# Patient Record
Sex: MALE | Race: WHITE | Employment: OTHER | URBAN - METROPOLITAN AREA
[De-identification: names, ages, dates, MRNs, and addresses within clinical notes are randomized per-mention and may not be internally consistent; named-entity substitution may affect disease eponyms.]

---

## 2017-10-26 ENCOUNTER — OFFICE VISIT (OUTPATIENT)
Dept: ORTHOPEDIC SURGERY | Age: 62
End: 2017-10-26

## 2017-10-26 VITALS
HEIGHT: 71 IN | SYSTOLIC BLOOD PRESSURE: 139 MMHG | DIASTOLIC BLOOD PRESSURE: 81 MMHG | HEART RATE: 101 BPM | WEIGHT: 199 LBS | BODY MASS INDEX: 27.86 KG/M2

## 2017-10-26 DIAGNOSIS — S62.660B OPEN NONDISPLACED FRACTURE OF DISTAL PHALANX OF RIGHT INDEX FINGER, INITIAL ENCOUNTER: Primary | ICD-10-CM

## 2017-10-26 DIAGNOSIS — S61.319A LACERATION OF NAIL BED OF FINGER, INITIAL ENCOUNTER: ICD-10-CM

## 2017-10-26 PROCEDURE — 26750 TREAT FINGER FRACTURE EACH: CPT | Performed by: ORTHOPAEDIC SURGERY

## 2017-10-26 PROCEDURE — 99203 OFFICE O/P NEW LOW 30 MIN: CPT | Performed by: ORTHOPAEDIC SURGERY

## 2017-10-26 NOTE — PROGRESS NOTES
This 58 y.o.  right hand dominant disabled man is seen in referral for the ED at [facility] with a chief complaint of injury to their right index finger, which was injured 4 days ago when using a table saw, the wooden board kicked back, striking the finger tip. He noticed pain and almost complete detachment of the finger nail . He was evaluated at the [facility] were obtained and the patient's nailbed laceration was repaired with absorbable sutures. He was placed on Keflex, tetanus updated, dressed and referred for hand/upper extremity evaluation and treatment. There is no history of additional significant injury. Symptoms have improved since the date of injury. The pain assessment has been reviewed and is correct. The patient's social history, past medical history, family history, medications, allergies and review of systems, entered 10/26/17,  have been reviewed, and dated and are recorded in the chart. On physical examination the patient is Height: 5' 11\" (180.3 cm) tall and weighs Weight: 199 lb (90.3 kg). Respirations are 18 per minute. The patient is well nourished, is oriented to time and place, demonstrates appropriate mood and affect as well as normal gait and station. There is moderate soft tissue swelling present about the right index finger, tip. There is mild discoloration. There is no deformity. Tenderness is present on palpation. Range of motion of the index finger is limited only on the right and is accompanied by pain. There is a small open wound at the tip. The nail has been replaced and sutured at the nail sulci. Distal circulation and sensation are intact. Gross muscle strength is normal bilaterally. Hand and wrist joints are stable. There are no subcutaneous nodules or enlarged epitrochlear lymph nodes. Xrays: Review of outside Xrays of the right index finger demonstrate a comminuted fracture of the distal tuft.     Impression: Open fracture distal

## 2017-10-26 NOTE — LETTER
[facility]  [address]  [address] [facility] [address]  Phone: [phone]  Fax: [phone]    Adam Coreas MD        October 26, 2017     [address] [place] [id number]    Patient: Reji Grider  MR Number: [id number]  YOB: 1955  Date of Visit: 10/26/2017    Dear Dr. Lucian Hernández:    Reji Grider was seen for evaluation. Below are the relevant portions of my assessment and plan of care. This 58 y.o.  right hand dominant disabled man is seen in referral for the ED at [facility] with a chief complaint of injury to their right index finger, which was injured 4 days ago when using a table saw, the wooden board kicked back, striking the finger tip. He noticed pain and almost complete detachment of the finger nail . He was evaluated at the [facility] were obtained and the patient's nailbed laceration was repaired with absorbable sutures. He was placed on Keflex, tetanus updated, dressed and referred for hand/upper extremity evaluation and treatment. There is no history of additional significant injury. Symptoms have improved since the date of injury. The pain assessment has been reviewed and is correct. The patient's social history, past medical history, family history, medications, allergies and review of systems, entered 10/26/17,  have been reviewed, and dated and are recorded in the chart. On physical examination the patient is Height: 5' 11\" (180.3 cm) tall and weighs Weight: 199 lb (90.3 kg). Respirations are 18 per minute. The patient is well nourished, is oriented to time and place, demonstrates appropriate mood and affect as well as normal gait and station. There is moderate soft tissue swelling present about the right index finger, tip. There is mild discoloration. There is no deformity.    Tenderness is

## 2017-11-06 ENCOUNTER — OFFICE VISIT (OUTPATIENT)
Dept: ORTHOPEDIC SURGERY | Age: 62
End: 2017-11-06

## 2017-11-06 VITALS — WEIGHT: 200 LBS | RESPIRATION RATE: 16 BRPM | BODY MASS INDEX: 28 KG/M2 | HEIGHT: 71 IN

## 2017-11-06 DIAGNOSIS — S62.660B OPEN NONDISPLACED FRACTURE OF DISTAL PHALANX OF RIGHT INDEX FINGER, INITIAL ENCOUNTER: Primary | ICD-10-CM

## 2017-11-06 DIAGNOSIS — S61.319A LACERATION OF NAIL BED OF FINGER, INITIAL ENCOUNTER: ICD-10-CM

## 2017-11-06 PROCEDURE — 99024 POSTOP FOLLOW-UP VISIT: CPT | Performed by: ORTHOPAEDIC SURGERY

## 2018-12-25 ENCOUNTER — APPOINTMENT (OUTPATIENT)
Dept: GENERAL RADIOLOGY | Age: 63
End: 2018-12-25
Payer: MEDICARE

## 2018-12-25 ENCOUNTER — HOSPITAL ENCOUNTER (EMERGENCY)
Age: 63
Discharge: HOME | End: 2018-12-25
Attending: EMERGENCY MEDICINE
Payer: MEDICARE

## 2018-12-25 VITALS
OXYGEN SATURATION: 97 % | HEART RATE: 93 BPM | RESPIRATION RATE: 16 BRPM | WEIGHT: 200 LBS | DIASTOLIC BLOOD PRESSURE: 99 MMHG | TEMPERATURE: 98.3 F | BODY MASS INDEX: 27.89 KG/M2 | SYSTOLIC BLOOD PRESSURE: 141 MMHG

## 2018-12-25 DIAGNOSIS — M25.571 ACUTE RIGHT ANKLE PAIN: ICD-10-CM

## 2018-12-25 DIAGNOSIS — S82.891A CLOSED FRACTURE OF RIGHT ANKLE, INITIAL ENCOUNTER: Primary | ICD-10-CM

## 2018-12-25 PROCEDURE — 6360000002 HC RX W HCPCS: Performed by: NURSE PRACTITIONER

## 2018-12-25 PROCEDURE — 94761 N-INVAS EAR/PLS OXIMETRY MLT: CPT

## 2018-12-25 PROCEDURE — 73610 X-RAY EXAM OF ANKLE: CPT

## 2018-12-25 PROCEDURE — 2700000000 HC OXYGEN THERAPY PER DAY

## 2018-12-25 PROCEDURE — 99284 EMERGENCY DEPT VISIT MOD MDM: CPT

## 2018-12-25 PROCEDURE — 94770 HC ETCO2 MONITOR DAILY: CPT

## 2018-12-25 PROCEDURE — 4500000024 HC ED LEVEL 4 PROCEDURE

## 2018-12-25 PROCEDURE — 2500000003 HC RX 250 WO HCPCS: Performed by: EMERGENCY MEDICINE

## 2018-12-25 PROCEDURE — 94664 DEMO&/EVAL PT USE INHALER: CPT

## 2018-12-25 PROCEDURE — 73600 X-RAY EXAM OF ANKLE: CPT

## 2018-12-25 PROCEDURE — 6370000000 HC RX 637 (ALT 250 FOR IP): Performed by: NURSE PRACTITIONER

## 2018-12-25 PROCEDURE — 96374 THER/PROPH/DIAG INJ IV PUSH: CPT

## 2018-12-25 RX ORDER — OXYCODONE HYDROCHLORIDE AND ACETAMINOPHEN 5; 325 MG/1; MG/1
1 TABLET ORAL ONCE
Status: DISCONTINUED | OUTPATIENT
Start: 2018-12-25 | End: 2018-12-25 | Stop reason: HOSPADM

## 2018-12-25 RX ORDER — OXYCODONE HYDROCHLORIDE AND ACETAMINOPHEN 5; 325 MG/1; MG/1
2 TABLET ORAL ONCE
Status: COMPLETED | OUTPATIENT
Start: 2018-12-25 | End: 2018-12-25

## 2018-12-25 RX ORDER — KETAMINE HYDROCHLORIDE 50 MG/ML
1 INJECTION, SOLUTION, CONCENTRATE INTRAMUSCULAR; INTRAVENOUS ONCE
Status: DISCONTINUED | OUTPATIENT
Start: 2018-12-25 | End: 2018-12-25 | Stop reason: HOSPADM

## 2018-12-25 RX ORDER — MORPHINE SULFATE 4 MG/ML
INJECTION, SOLUTION INTRAMUSCULAR; INTRAVENOUS
Status: DISCONTINUED
Start: 2018-12-25 | End: 2018-12-25 | Stop reason: HOSPADM

## 2018-12-25 RX ORDER — KETAMINE HYDROCHLORIDE 100 MG/ML
INJECTION, SOLUTION INTRAMUSCULAR; INTRAVENOUS
Status: DISCONTINUED
Start: 2018-12-25 | End: 2018-12-25 | Stop reason: HOSPADM

## 2018-12-25 RX ORDER — OXYCODONE HYDROCHLORIDE AND ACETAMINOPHEN 5; 325 MG/1; MG/1
1 TABLET ORAL EVERY 6 HOURS PRN
Qty: 6 TABLET | Refills: 0 | Status: SHIPPED | OUTPATIENT
Start: 2018-12-25 | End: 2018-12-28

## 2018-12-25 RX ORDER — KETAMINE HYDROCHLORIDE 100 MG/ML
INJECTION, SOLUTION INTRAMUSCULAR; INTRAVENOUS DAILY PRN
Status: COMPLETED | OUTPATIENT
Start: 2018-12-25 | End: 2018-12-25

## 2018-12-25 RX ORDER — ONDANSETRON 2 MG/ML
4 INJECTION INTRAMUSCULAR; INTRAVENOUS ONCE
Status: COMPLETED | OUTPATIENT
Start: 2018-12-25 | End: 2018-12-25

## 2018-12-25 RX ORDER — MORPHINE SULFATE 4 MG/ML
4 INJECTION, SOLUTION INTRAMUSCULAR; INTRAVENOUS ONCE
Status: DISCONTINUED | OUTPATIENT
Start: 2018-12-25 | End: 2018-12-25 | Stop reason: HOSPADM

## 2018-12-25 RX ADMIN — KETAMINE HYDROCHLORIDE 90 MG: 100 INJECTION INTRAMUSCULAR; INTRAVENOUS at 18:08

## 2018-12-25 RX ADMIN — ONDANSETRON 4 MG: 2 INJECTION INTRAMUSCULAR; INTRAVENOUS at 18:19

## 2018-12-25 RX ADMIN — OXYCODONE AND ACETAMINOPHEN 2 TABLET: 5; 325 TABLET ORAL at 17:41

## 2018-12-25 ASSESSMENT — ENCOUNTER SYMPTOMS
COUGH: 0
DIARRHEA: 0
COLOR CHANGE: 0
BACK PAIN: 0
SHORTNESS OF BREATH: 0
VOMITING: 0
NAUSEA: 0
ABDOMINAL PAIN: 0

## 2018-12-25 ASSESSMENT — PAIN SCALES - GENERAL
PAINLEVEL_OUTOF10: 0
PAINLEVEL_OUTOF10: 0
PAINLEVEL_OUTOF10: 8

## 2018-12-25 NOTE — ED PROVIDER NOTES
[address] [facility]  ED  eMERGENCY dEPARTMENT eNCOUnter        Pt Name: Robson Kline  MRN: [id number]  [place] 1955  Date of evaluation: 12/25/2018  Provider: DIONE Romo - SHANNAN  PCP: Bonita Infante    This patient was seen and evaluated by the attending physician Ed Lopez DO.    CHIEF COMPLAINT       Chief Complaint   Patient presents with    Fall     injured right ankle today going down stairs       HISTORY OF PRESENT ILLNESS   (Location/Symptom, Timing/Onset, Context/Setting, Quality, Duration, Modifying Factors, Severity)  Note limiting factors. Robson Kline is a 61 y.o. male who presents emergency Department with right ankle pain and swelling, patient states that he was going down the stairs when he slipped and fell twisting is a cold. He has obvious swelling and deformity to the medial malleoli, denies any numbness tingling or paresthesias. States the injury occurred prior to ED arrival.  Rates the pain an 8 out of 10. Denies taking anything for pain. No numbness tingling or paresthesias. No additional complaints, no additional aggravating or relieving factors. The patient presents awake, alert and in no acute respiratory distress or toxic appearance. Nursing Notes were all reviewed and agreed with or any disagreements were addressed  in the HPI. REVIEW OF SYSTEMS    (2-9 systems for level 4, 10 or more for level 5)     Review of Systems   Constitutional: Negative for chills and fever. HENT: Negative for congestion. Respiratory: Negative for cough and shortness of breath. Cardiovascular: Negative for chest pain. Gastrointestinal: Negative for abdominal pain, diarrhea, nausea and vomiting. Musculoskeletal: Positive for arthralgias and joint swelling. Negative for back pain. Patient complains of right ankle pain and swelling, patient states movement worsens the pain, denies any numbness tingling or paresthesias.    Skin: Negative

## 2025-02-03 ENCOUNTER — TRANSCRIBE ORDERS (OUTPATIENT)
Dept: ADMINISTRATIVE | Age: 70
End: 2025-02-03

## 2025-02-03 DIAGNOSIS — Z87.891 PERSONAL HISTORY OF TOBACCO USE: Primary | ICD-10-CM

## 2025-02-03 DIAGNOSIS — F17.211 CIGARETTE NICOTINE DEPENDENCE IN REMISSION: ICD-10-CM

## 2025-02-11 ENCOUNTER — HOSPITAL ENCOUNTER (OUTPATIENT)
Dept: CT IMAGING | Age: 70
Discharge: HOME | End: 2025-02-11
Payer: MEDICARE

## 2025-02-11 DIAGNOSIS — Z87.891 PERSONAL HISTORY OF TOBACCO USE: ICD-10-CM

## 2025-02-11 DIAGNOSIS — F17.211 CIGARETTE NICOTINE DEPENDENCE IN REMISSION: ICD-10-CM

## 2025-02-11 PROCEDURE — 71271 CT THORAX LUNG CANCER SCR C-: CPT

## 2025-06-26 ENCOUNTER — HOSPITAL ENCOUNTER (OUTPATIENT)
Age: 70
Discharge: HOME | End: 2025-06-26
Payer: MEDICARE

## 2025-06-26 ENCOUNTER — HOSPITAL ENCOUNTER (OUTPATIENT)
Dept: GENERAL RADIOLOGY | Age: 70
Discharge: HOME | End: 2025-06-26
Payer: MEDICARE

## 2025-06-26 PROCEDURE — 72040 X-RAY EXAM NECK SPINE 2-3 VW: CPT
